# Patient Record
Sex: FEMALE | Race: WHITE | URBAN - METROPOLITAN AREA
[De-identification: names, ages, dates, MRNs, and addresses within clinical notes are randomized per-mention and may not be internally consistent; named-entity substitution may affect disease eponyms.]

---

## 2017-10-05 PROBLEM — G47.30 SLEEP APNEA IN ADULT: Status: ACTIVE | Noted: 2017-10-05

## 2017-11-16 ENCOUNTER — APPOINTMENT (RX ONLY)
Dept: URBAN - METROPOLITAN AREA CLINIC 31 | Facility: CLINIC | Age: 62
Setting detail: DERMATOLOGY
End: 2017-11-16

## 2017-11-16 DIAGNOSIS — L57.0 ACTINIC KERATOSIS: ICD-10-CM

## 2017-11-16 DIAGNOSIS — D18.0 HEMANGIOMA: ICD-10-CM

## 2017-11-16 DIAGNOSIS — L57.8 OTHER SKIN CHANGES DUE TO CHRONIC EXPOSURE TO NONIONIZING RADIATION: ICD-10-CM

## 2017-11-16 DIAGNOSIS — D22 MELANOCYTIC NEVI: ICD-10-CM

## 2017-11-16 DIAGNOSIS — L82.1 OTHER SEBORRHEIC KERATOSIS: ICD-10-CM

## 2017-11-16 PROBLEM — L40.0 PSORIASIS VULGARIS: Status: ACTIVE | Noted: 2017-11-16

## 2017-11-16 PROBLEM — D22.5 MELANOCYTIC NEVI OF TRUNK: Status: ACTIVE | Noted: 2017-11-16

## 2017-11-16 PROBLEM — D18.01 HEMANGIOMA OF SKIN AND SUBCUTANEOUS TISSUE: Status: ACTIVE | Noted: 2017-11-16

## 2017-11-16 PROCEDURE — ? COUNSELING

## 2017-11-16 PROCEDURE — 17000 DESTRUCT PREMALG LESION: CPT

## 2017-11-16 PROCEDURE — 17003 DESTRUCT PREMALG LES 2-14: CPT

## 2017-11-16 PROCEDURE — ? LIQUID NITROGEN

## 2017-11-16 PROCEDURE — 99214 OFFICE O/P EST MOD 30 MIN: CPT | Mod: 25

## 2017-11-16 ASSESSMENT — LOCATION SIMPLE DESCRIPTION DERM
LOCATION SIMPLE: RIGHT FOREARM
LOCATION SIMPLE: LEFT SHOULDER
LOCATION SIMPLE: RIGHT TEMPLE
LOCATION SIMPLE: LEFT ANTERIOR NECK
LOCATION SIMPLE: RIGHT NOSE
LOCATION SIMPLE: LEFT UPPER BACK
LOCATION SIMPLE: CHEST
LOCATION SIMPLE: LEFT FOREARM
LOCATION SIMPLE: LEFT HAND
LOCATION SIMPLE: RIGHT CHEEK
LOCATION SIMPLE: RIGHT HAND
LOCATION SIMPLE: RIGHT UPPER BACK

## 2017-11-16 ASSESSMENT — LOCATION DETAILED DESCRIPTION DERM
LOCATION DETAILED: LEFT MEDIAL INFERIOR CHEST
LOCATION DETAILED: LEFT MEDIAL UPPER BACK
LOCATION DETAILED: RIGHT MEDIAL TEMPLE
LOCATION DETAILED: RIGHT RADIAL DORSAL HAND
LOCATION DETAILED: LEFT POSTERIOR SHOULDER
LOCATION DETAILED: LEFT ULNAR DORSAL HAND
LOCATION DETAILED: RIGHT INFERIOR CENTRAL MALAR CHEEK
LOCATION DETAILED: RIGHT PROXIMAL DORSAL FOREARM
LOCATION DETAILED: LEFT SUPERIOR ANTERIOR NECK
LOCATION DETAILED: LEFT PROXIMAL DORSAL FOREARM
LOCATION DETAILED: RIGHT MID-UPPER BACK
LOCATION DETAILED: RIGHT NASAL SIDEWALL

## 2017-11-16 ASSESSMENT — LOCATION ZONE DERM
LOCATION ZONE: ARM
LOCATION ZONE: HAND
LOCATION ZONE: NOSE
LOCATION ZONE: FACE
LOCATION ZONE: NECK
LOCATION ZONE: TRUNK

## 2017-11-16 NOTE — PROCEDURE: REASSURANCE
Detail Level: Zone
Include Location In Plan?: No
Additional Note: Patient is reassured regarding the benign nature of this/these lesion(s).  Patient is encouraged to return for evaluation if lesion(s) grow, change, or becomes symptomatic.\\n

## 2017-11-16 NOTE — PROCEDURE: LIQUID NITROGEN
Consent: The patient's consent was obtained including but not limited to risks of crusting, scabbing, blistering, scarring, darker or lighter pigmentary change, recurrence, incomplete removal and infection.
Render Post-Care Instructions In Note?: no
Detail Level: Detailed
Duration Of Freeze Thaw-Cycle (Seconds): 15
Number Of Freeze-Thaw Cycles: 1 freeze-thaw cycle
Post-Care Instructions: I reviewed with the patient in detail post-care instructions. Patient is to wear sunprotection, and avoid picking at any of the treated lesions. Pt may apply Vaseline to crusted or scabbing areas.

## 2017-12-26 PROBLEM — E78.5 DYSLIPIDEMIA: Status: ACTIVE | Noted: 2017-12-26

## 2017-12-26 PROBLEM — E03.8 SUBCLINICAL HYPOTHYROIDISM: Status: ACTIVE | Noted: 2017-12-26

## 2018-04-12 ENCOUNTER — OFFICE VISIT (OUTPATIENT)
Dept: RHEUMATOLOGY | Facility: PHYSICIAN GROUP | Age: 63
End: 2018-04-12
Payer: MEDICAID

## 2018-04-12 VITALS
TEMPERATURE: 98.5 F | OXYGEN SATURATION: 96 % | SYSTOLIC BLOOD PRESSURE: 122 MMHG | BODY MASS INDEX: 28.02 KG/M2 | HEART RATE: 90 BPM | DIASTOLIC BLOOD PRESSURE: 92 MMHG | RESPIRATION RATE: 12 BRPM | HEIGHT: 65 IN | WEIGHT: 168.2 LBS

## 2018-04-12 DIAGNOSIS — M79.643 PAIN OF HAND, UNSPECIFIED LATERALITY: ICD-10-CM

## 2018-04-12 PROCEDURE — 99204 OFFICE O/P NEW MOD 45 MIN: CPT | Performed by: INTERNAL MEDICINE

## 2018-04-12 ASSESSMENT — PAIN SCALES - GENERAL: PAINLEVEL: 1=MINIMAL PAIN

## 2018-04-12 NOTE — PROGRESS NOTES
Chief Complaint- hand pain    Chief Complaint   Patient presents with   • New Patient     Arthritis     Malu Reyes is a 62 y.o. female here as a new Rheumatology Consult referred by Margoth Elliott D.O. for consultation regarding hand pain and erosive CMC arthropahty    HPI:     Ms. Malu Reyes present with a history of psoriasis and history of osteoarthritis and left CMC joint.    SHe has had pain in the hands bilateral  Ongoing for 20 years.  She notes pain in the thenar eminence.  She has had injections with some success.  The pain in the hands is intermittent and often dependent on the work she does.  She also notes the 2nd right hand is turning.    In terms of her joint pain she feels it hurts at different times, however she also notes that merary hands are stiff in the mornings.  She also has pain in the shoulders.  She also has pain in the elbows that is caused by use.    She does have a history of psoriasis that is relatively controlled with placing ointment on it.      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      Past Medical History: psoriasis since age 14, hypothyroidism    Family History: thyroid cancer, goiter    Social History: former smoker    Current medicines (including changes today)  Current Outpatient Prescriptions   Medication Sig Dispense Refill   • Diclofenac Sodium 1 % Gel Apply 1 Application to skin as directed 2 times a day as needed. 30 g 0   • levothyroxine (SYNTHROID) 50 MCG Tab Take 1 Tab by mouth Every morning on an empty stomach. 90 Tab 1   • Probiotic Product (PRO-BIOTIC BLEND) Cap Take  by mouth.     • Amino Acids (AMINO ACID PO) Take  by mouth.     • TURMERIC PO Take  by mouth.     • magnesium oxide (MAG-OX) 400 MG Tab Take 400 mg by mouth every day.     • Non Formulary Request every day.     • ASHWAGANDHA PO Take  by mouth.     • Boswellia Paige (BOSWELLIA PO) Take  by mouth.     • vitamin D (CHOLECALCIFEROL) 1000 UNIT Tab  "Take 1,000 Units by mouth every day.       No current facility-administered medications for this visit.      She  has a past medical history of Arthritis; Baker's cyst of knee; Dyslipidemia, LDL goal < 160 (2017); Osteoarthritis of left thumb (2016); Sleep apnea in adult (10/5/2017); and Subclinical hypothyroidism (2017).  She  has a past surgical history that includes other orthopedic surgery (Left, 2016).  Family History   Problem Relation Age of Onset   • Arthritis Mother    • Heart Disease Mother    • Hypertension Mother    • Cancer Father    • Arthritis Sister    • Diabetes Sister    • Hypertension Sister    • Arthritis Sister    • Hypertension Sister      Family Status   Relation Status   • Mother Alive   • Father    • Sister    • Sister      Social History   Substance Use Topics   • Smoking status: Former Smoker     Packs/day: 0.50     Years: 6.00     Types: Cigarettes     Quit date: 4/10/1980   • Smokeless tobacco: Never Used   • Alcohol use 0.6 - 1.2 oz/week     1 - 2 Glasses of wine per week      Comment: Rare     Social History     Social History Narrative   • No narrative on file         ROS  Constitutional ROS: No unexpected change in weight  Eye ROS: No eye pain, redness, discharge  Ear ROS: No ear pain  Mouth/Throat ROS: No bleeding gums  Neck ROS: No lumps or masses, No swollen glands  Pulmonary ROS: No wheezing, No shortness of breath  Cardiovascular ROS: No chest pain, No shortness of breath  Gastrointestinal ROS: No change in bowel habits but has chronic constipation  Musculoskeletal/Extremities ROS: No clubbing, Positive for arthritis   Hematologic/Lymphatic ROS: No coagulation disorder  Skin/Integumentary ROS: psoriasis  Neurologic ROS: Normal development       Objective:     Blood pressure 122/92, pulse 90, temperature 36.9 °C (98.5 °F), resp. rate 12, height 1.651 m (5' 5\"), weight 76.3 kg (168 lb 3.2 oz), SpO2 96 %. Body mass index is 27.99 kg/m².  Physical " "Exam:    Vitals:    04/12/18 1053   BP: 122/92   Pulse: 90   Resp: 12   Temp: 36.9 °C (98.5 °F)   SpO2: 96%   Weight: 76.3 kg (168 lb 3.2 oz)   Height: 1.651 m (5' 5\")    Body mass index is 27.99 kg/m².    General/Constitutional: NAD not diaphoretic, comfortable  Eyes: clear conjunctiva, no scleral icterus, EOMI, PERRL  Ears, Nose, Mouth,Throat: no oral ulcers, good dentition, moist mucous membranes, no discharge from ears bilaterally  Cardiovascular: regular rate and rhythm.  No murmurs, gallops, rubs  Respiratory: normal effort, unlabored respiration.  On auscultation no wheezes, rales, rhonchi.  Clear to auscultation.  GI: soft, NTTP no hepatosplenomegaly, nondistended  Musculoskeletal  Axial:  Thoracic: no kyphosis  Upper Extremities:  No synovitis of the PIP, DIP, MCP but tenderness at the CMC>  There is mild soreness in the PIP of the 2nd and 3rd joint  Heberbon nodes appreciated at the DIP  Wrists and Elbows have good ROM  Muscle Strength: 5/5 in deltoids, biceps, triceps, finger , wrists extension bilateral  Lower Extremities:  No knee effusion bilateral, No crepitus bilateral  No MTP tenderness bilateral; no enthesitis at the Achilles  Muscle Strength: 5/5 in dorsiflexion, plantarflexion, knee extension, knee flexion, and hip flexion bilateral  Gait is normal  Skin: Limited skin exam.  no rashes, no digital ulcerations, no alopecia, no tophi, no skin thickening, no nodules.  On the forearm and elbow a few patches of psoriasis on the extensor surface of the elbow  Neuro: CN II-XII grossly intact, Alert, Oriented x 3, moves all four extremities  Psych: normal affect, normal mood, judgement appropriate, follows commands, responses are appropritae  Heme/Lymph: no cervical adenopathy      No results found for: QNTTBGOLD  No results found for: HEPBCORTOT, HEPBCORIGM, HEPBSAG  No results found for: HEPCAB  Lab Results   Component Value Date/Time    SODIUM 138 09/13/2017 09:19 AM    POTASSIUM 4.7 09/13/2017 " 09:19 AM    CHLORIDE 101 09/13/2017 09:19 AM    CO2 20 09/13/2017 09:19 AM    GLUCOSE 95 09/13/2017 09:19 AM    BUN 14 09/13/2017 09:19 AM    CREATININE 0.81 09/13/2017 09:19 AM    BUNCREATRAT 17 09/13/2017 09:19 AM      Lab Results   Component Value Date/Time    WBC 7.5 12/18/2017 10:20 AM    RBC 4.95 12/18/2017 10:20 AM    HEMOGLOBIN 15.8 12/18/2017 10:20 AM    HEMATOCRIT 45.2 12/18/2017 10:20 AM    MCV 91.3 12/18/2017 10:20 AM    MCH 31.9 (H) 12/18/2017 10:20 AM    MCHC 35.0 12/18/2017 10:20 AM    MPV 11.6 (H) 12/18/2017 10:20 AM      Lab Results   Component Value Date/Time    CALCIUM 9.6 09/13/2017 09:19 AM    ASTSGOT 17 09/13/2017 09:19 AM    ALTSGPT 19 09/13/2017 09:19 AM    ALKPHOSPHAT 116 09/13/2017 09:19 AM    TBILIRUBIN 0.5 09/13/2017 09:19 AM    ALBUMIN 4.5 09/13/2017 09:19 AM    TOTPROTEIN 7.0 09/13/2017 09:19 AM     Lab Results   Component Value Date/Time    RHEUMFACTN <10.0 10/13/2017 09:34 AM     No results found for: SEDRATEWES, CREACTPROT  No results found for: RUSSELVIPER, DRVVTINTERP  No results found for: P5LCVEBPTSI, N7QWPGMGKCC, IGGCARDIOLI, IGMCARDIOLI, IGACARDIOLI, CRYOGLOBULIN  Lab Results   Component Value Date/Time    ANADIRECT Negative 10/13/2017 09:34 AM     No results found for: ANTIDNADS, DSDNA, AGBMAB, GBMABA, ANCAIGG, [id number], JO1AB, RNPAB, ANTISSASJ, ANTISSBSJ  No results found for: COLORURINE, SPECGRAVITY, PHURINE, GLUCOSEUR, KETONES, PROTEINURIN  No results found for: TOTPROTUR, TOTALVOLUME, [id number]  No results found for: SSA60, SSA52  No results found for: HBA1C  No results found for: CPKTOTAL  No results found for: G6PD  No results found for: [id number]  No results found for: ACESERUM  Lab Results   Component Value Date/Time    25HYDROXY 34.4 10/13/2017 09:34 AM     Lab Results   Component Value Date/Time    TSH 3.990 10/13/2017 09:34 AM    FREEDIR 1.19 04/29/2015 08:57 AM     Lab Results   Component Value Date/Time    TSHULTRASEN 1.65 12/18/2017 10:20 AM    FREET4 1.17  12/18/2017 10:20 AM     No results found for: MICROSOMALA, ANTITHYROGL  No results found for: IGGLYMEABS  No results found for: ANTIMITOCHO, FACTIN  No results found for: IGA, TTRANSIGA, ENDOIGA  No results found for: FLTYPE, CRYSTALSBDF  No results found for: ISTATICAL  No results found for: ISTATCREAT  No results found for: CTELOPEP  No results found for: GBMABG  No results found for: PTHINTACT          Results for orders placed in visit on 02/12/16   DX-KNEE COMPLETE 4+ LEFT    Impression Plain film radiographs of the knees obtained today reveal mild  degenerative changes mainly in the medial compartment.  There is  some loss of joint space but no significant osteophyte formation  appreciated.             Results for orders placed in visit on 05/26/15   HAND 3+    Impression Three views of both the left and the right hand show that she has  severe arthritic changes of the CMC joint of both thumbs.           Results for orders placed during the hospital encounter of 10/24/17   DX-FINGER(S) 2+ RIGHT    Impression Degenerative changes as above.    Angel Dunn MD  10/24/2017 11:54 AM          Results for orders placed in visit on 06/24/15   CERVICAL SPINE-2 OR 3 VIEWS    Impression Radiographic evaluation in the clinic today, 3 views of the  cervical spine, demonstrate C5-6 disc space narrowing and  arthritic changes.            Assessment and Plan:  Ms. Malu Reyes presents with a history of arthralgias and psoriasis.  She does note inflammatory features however they are somewhat vague.  Will consider that there are both mechanical and an inflammatory component in her symptoms.  Will consider plaquenil.  We did discuss this with the patient today.  Will work-up for spondyloarthropathy. She does not have features of inflammatory back pain so will not pursue sacroiliac films.    I reviewed the side effects of hydroxychloroquine including but not limited to headache, retinal toxicity, skin rash.  I emphasized  the importance of baseline and annual follow-up with evaluation with ophthalmology.    1. Pain of hand, unspecified laterality  [id number]    CCP    G6PD QUANT + RBC    CBC WITH DIFFERENTIAL    COMP METABOLIC PANEL    CRP QUANTITIVE (NON-CARDIAC)    WESTERGREN SED RATE           Followup: Return in about 4 weeks (around 5/10/2018). or sooner prn  Patient was seen 60 minutes face-to-face (excluding time for procedures)  of which more than 50% the time was spent counseling the patient regarding  rheumatological conditions and care. Therapy was discussed in detail.  Thank you for this referral.

## 2018-04-12 NOTE — LETTER
[facility]   [address], [address]  [place] [address]  Phone: [phone]  Fax: [phone]              Encounter Date: 4/12/2018    Dear Dr. Elliott,    It was a pleasure seeing your patient, Malu Reyes, on 4/12/2018. The encounter diagnosis was Pain of hand, unspecified laterality.     Please find attached progress note which includes the history I obtained from Ms. Reyes, my physical examination findings, my impression and recommendations.      Once again, it was a pleasure participating in your patient's care.  Please feel free to contact me if you have any questions or if I can be of any further assistance to your patients.      Sincerely,    Ewa Shelton M.D.  Electronically Signed          PROGRESS NOTE:  Chief Complaint- hand pain    Chief Complaint   Patient presents with   • New Patient     Arthritis     Malu Reyes is a 62 y.o. female here as a new Rheumatology Consult referred by Margoth Elliott D.O. for consultation regarding hand pain and erosive CMC arthropahty    HPI:     Ms. Malu Reyes present with a history of psoriasis and history of osteoarthritis and left CMC joint.    SHe has had pain in the hands bilateral  Ongoing for 20 years.  She notes pain in the thenar eminence.  She has had injections.  The pain in the hands is intermittent and often dependent on the work she does.  She also notes the 2nd right hand is turning.    In terms of her joint pain she feels it hurts at different times.  Her hands are stiff in the mornings.    She also has pain n the shoulders.  She also has pain in the elbows that is caused by use.    She does have a history of psoriasis that is relatively controlled with placing ointment on it.      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      Past Medical History: psoriasis since age 14, hypothyroidism    Family History: thyroid cancer, goiter      Current medicines (including  changes today)  Current Outpatient Prescriptions   Medication Sig Dispense Refill   • levothyroxine (SYNTHROID) 50 MCG Tab Take 1 Tab by mouth Every morning on an empty stomach. 90 Tab 1   • Probiotic Product (PRO-BIOTIC BLEND) Cap Take  by mouth.     • Amino Acids (AMINO ACID PO) Take  by mouth.     • TURMERIC PO Take  by mouth.     • magnesium oxide (MAG-OX) 400 MG Tab Take 400 mg by mouth every day.     • Non Formulary Request every day.     • ASHWAGANDHA PO Take  by mouth.     • Boswellia Paige (BOSWELLIA PO) Take  by mouth.     • vitamin D (CHOLECALCIFEROL) 1000 UNIT Tab Take 1,000 Units by mouth every day.       No current facility-administered medications for this visit.      She  has a past medical history of Arthritis; Baker's cyst of knee; Dyslipidemia, LDL goal < 160 (2017); Osteoarthritis of left thumb (2016); Sleep apnea in adult (10/5/2017); and Subclinical hypothyroidism (2017).  She  has a past surgical history that includes other orthopedic surgery (Left, 2016).  Family History   Problem Relation Age of Onset   • Arthritis Mother    • Heart Disease Mother    • Hypertension Mother    • Cancer Father    • Arthritis Sister    • Diabetes Sister    • Hypertension Sister    • Arthritis Sister    • Hypertension Sister      Family Status   Relation Status   • Mother Alive   • Father    • Sister    • Sister      Social History   Substance Use Topics   • Smoking status: Former Smoker     Packs/day: 0.50     Years: 6.00     Types: Cigarettes     Quit date: 4/10/1980   • Smokeless tobacco: Never Used   • Alcohol use 0.6 - 1.2 oz/week     1 - 2 Glasses of wine per week      Comment: Rare     Social History     Social History Narrative   • No narrative on file         ROS  Constitutional ROS: No unexpected change in weight  Eye ROS: No eye pain, redness, discharge  Ear ROS: No ear pain  Mouth/Throat ROS: No bleeding gums  Neck ROS: No lumps or masses, No swollen glands  Pulmonary  "ROS: No wheezing, No shortness of breath  Cardiovascular ROS: No chest pain, No shortness of breath  Gastrointestinal ROS: No change in bowel habits but has chronic constipation  Musculoskeletal/Extremities ROS: No clubbing, Positive for arthritis   Hematologic/Lymphatic ROS: No coagulation disorder  Skin/Integumentary ROS: psoriasis  Neurologic ROS: Normal development       Objective:     Blood pressure 122/92, pulse 90, temperature 36.9 °C (98.5 °F), resp. rate 12, height 1.651 m (5' 5\"), weight 76.3 kg (168 lb 3.2 oz), SpO2 96 %. Body mass index is 27.99 kg/m².  Physical Exam:    Vitals:    04/12/18 1053   BP: 122/92   Pulse: 90   Resp: 12   Temp: 36.9 °C (98.5 °F)   SpO2: 96%   Weight: 76.3 kg (168 lb 3.2 oz)   Height: 1.651 m (5' 5\")    Body mass index is 27.99 kg/m².    General/Constitutional: NAD not diaphoretic, comfortable  Eyes: clear conjunctiva, no scleral icterus, EOMI, PERRL  Ears, Nose, Mouth,Throat: no oral ulcers, good dentition, moist mucous membranes, no discharge from ears bilaterally  Cardiovascular: regular rate and rhythm.  No murmurs, gallops, rubs  Respiratory: normal effort, unlabored respiration.  On auscultation no wheezes, rales, rhonchi.  Clear to auscultation.  GI: soft, NTTP no hepatosplenomegaly, nondistended  Musculoskeletal  Axial:  Cervical  Thoracic: no kyphosis  Lumbar  Upper Extremities:  No synovitis of the PIP, DIP, MCP but tenderness at the CMC>  There is mild soreness in the PIP of the 2nd and 3rd joint  Heberbon nodes appreciated at the DIP  Wrists and Elbows have good ROM  Muscle Strength: 5/5 in deltoids, biceps, triceps, finger , wrists extension bilateral  Lower Extremities:  No knee effusion bilateral, No crepitus bilateral  No MTP tenderness bilateral; no enthesitis at the Achilles  Muscle Strength: 5/5 in dorsiflexion, plantarflexion, knee extension, knee flexion, and hip flexion bilateral  Gait is normal  Skin: Limited skin exam.  no rashes, no digital " ulcerations, no alopecia, no tophi, no skin thickening, no nodules  Neuro: CN II-XII grossly intact, Alert, Oriented x 3, moves all four extremities  Psych: normal affect, normal mood, judgement appropriate, follows commands, responses are appropritae  Heme/Lymph: no cervical adenopathy      No results found for: QNTTBGOLD  No results found for: HEPBCORTOT, HEPBCORIGM, HEPBSAG  No results found for: HEPCAB  Lab Results   Component Value Date/Time    SODIUM 138 09/13/2017 09:19 AM    POTASSIUM 4.7 09/13/2017 09:19 AM    CHLORIDE 101 09/13/2017 09:19 AM    CO2 20 09/13/2017 09:19 AM    GLUCOSE 95 09/13/2017 09:19 AM    BUN 14 09/13/2017 09:19 AM    CREATININE 0.81 09/13/2017 09:19 AM    BUNCREATRAT 17 09/13/2017 09:19 AM      Lab Results   Component Value Date/Time    WBC 7.5 12/18/2017 10:20 AM    RBC 4.95 12/18/2017 10:20 AM    HEMOGLOBIN 15.8 12/18/2017 10:20 AM    HEMATOCRIT 45.2 12/18/2017 10:20 AM    MCV 91.3 12/18/2017 10:20 AM    MCH 31.9 (H) 12/18/2017 10:20 AM    MCHC 35.0 12/18/2017 10:20 AM    MPV 11.6 (H) 12/18/2017 10:20 AM      Lab Results   Component Value Date/Time    CALCIUM 9.6 09/13/2017 09:19 AM    ASTSGOT 17 09/13/2017 09:19 AM    ALTSGPT 19 09/13/2017 09:19 AM    ALKPHOSPHAT 116 09/13/2017 09:19 AM    TBILIRUBIN 0.5 09/13/2017 09:19 AM    ALBUMIN 4.5 09/13/2017 09:19 AM    TOTPROTEIN 7.0 09/13/2017 09:19 AM     Lab Results   Component Value Date/Time    RHEUMFACTN <10.0 10/13/2017 09:34 AM     No results found for: SEDRATEWES, CREACTPROT  No results found for: RUSSELVIPER, DRVVTINTERP  No results found for: [id number], [id number], IGGCARDIOLI, IGMCARDIOLI, IGACARDIOLI, CRYOGLOBULIN  Lab Results   Component Value Date/Time    ANADIRECT Negative 10/13/2017 09:34 AM     No results found for: ANTIDNADS, DSDNA, AGBMAB, GBMABA, ANCAIGG, [id number], JO1AB, RNPAB, ANTISSASJ, ANTISSBSJ  No results found for: COLORURINE, SPECGRAVITY, PHURINE, GLUCOSEUR, KETONES, PROTEINURIN  No results found for:  TOTPROTUR, TOTALVOLUME, [id number]  No results found for: SSA60, SSA52  No results found for: HBA1C  No results found for: CPKTOTAL  No results found for: G6PD  No results found for: [id number]  No results found for: ACESERUM  Lab Results   Component Value Date/Time    25HYDROXY 34.4 10/13/2017 09:34 AM     Lab Results   Component Value Date/Time    TSH 3.990 10/13/2017 09:34 AM    FREEDIR 1.19 04/29/2015 08:57 AM     Lab Results   Component Value Date/Time    TSHULTRASEN 1.65 12/18/2017 10:20 AM    FREET4 1.17 12/18/2017 10:20 AM     No results found for: MICROSOMALA, ANTITHYROGL  No results found for: IGGLYMEABS  No results found for: ANTIMITOCHO, FACTIN  No results found for: IGA, TTRANSIGA, ENDOIGA  No results found for: FLTYPE, CRYSTALSBDF  No results found for: ISTATICAL  No results found for: ISTATCREAT  No results found for: CTELOPEP  No results found for: GBMABG  No results found for: PTHINTACT                         Results for orders placed in visit on 02/12/16   DX-KNEE COMPLETE 4+ LEFT    Impression Plain film radiographs of the knees obtained today reveal mild  degenerative changes mainly in the medial compartment.  There is  some loss of joint space but no significant osteophyte formation  appreciated.             Results for orders placed in visit on 05/26/15   HAND 3+    Impression Three views of both the left and the right hand show that she has  severe arthritic changes of the CMC joint of both thumbs.           Results for orders placed during the hospital encounter of 10/24/17   DX-FINGER(S) 2+ RIGHT    Impression Degenerative changes as above.    Angel Dunn MD  10/24/2017 11:54 AM                                      Results for orders placed in visit on 06/24/15   CERVICAL SPINE-2 OR 3 VIEWS    Impression Radiographic evaluation in the clinic today, 3 views of the  cervical spine, demonstrate C5-6 disc space narrowing and  arthritic changes.                                                  Assessment and Plan:  Ms. Malu Reyes presents with a history of arthralgias.      Followup: No Follow-up on file. or sooner prn  Patient was seen 60 minutes face-to-face (excluding time for procedures)  of which more than 50% the time was spent counseling the patient regarding  rheumatological conditions and care. Therapy was discussed in detail.  Thank you for this referral.

## 2018-05-17 ENCOUNTER — OFFICE VISIT (OUTPATIENT)
Dept: RHEUMATOLOGY | Facility: PHYSICIAN GROUP | Age: 63
End: 2018-05-17
Payer: MEDICAID

## 2018-05-17 VITALS
SYSTOLIC BLOOD PRESSURE: 120 MMHG | BODY MASS INDEX: 27.92 KG/M2 | OXYGEN SATURATION: 96 % | HEART RATE: 88 BPM | RESPIRATION RATE: 16 BRPM | DIASTOLIC BLOOD PRESSURE: 82 MMHG | TEMPERATURE: 98.6 F | WEIGHT: 167.8 LBS

## 2018-05-17 DIAGNOSIS — L40.9 PSORIASIS: ICD-10-CM

## 2018-05-17 DIAGNOSIS — M18.12 OSTEOARTHRITIS OF LEFT THUMB: ICD-10-CM

## 2018-05-17 PROCEDURE — 99213 OFFICE O/P EST LOW 20 MIN: CPT | Performed by: INTERNAL MEDICINE

## 2018-05-17 RX ORDER — THIAMINE HCL 100 MG
TABLET ORAL
COMMUNITY

## 2018-05-17 ASSESSMENT — PAIN SCALES - GENERAL: PAINLEVEL: 4=SLIGHT-MODERATE PAIN

## 2018-05-17 ASSESSMENT — ENCOUNTER SYMPTOMS
CHILLS: 0
FEVER: 0

## 2018-05-17 NOTE — LETTER
[facility]   [address], [address]  [place] [address]  Phone: [phone]  Fax: [phone]              Encounter Date: 5/17/2018    Dear Dr. Elliott,    It was a pleasure seeing your patient, Malu Reyes, on 5/17/2018. Diagnoses of Osteoarthritis of left thumb and Psoriasis were pertinent to this visit.     Please find attached progress note which includes the history I obtained from Ms. Reyes, my physical examination findings, my impression and recommendations.      Once again, it was a pleasure participating in your patient's care.  Please feel free to contact me if you have any questions or if I can be of any further assistance to your patients.      Sincerely,    Ewa Shelton M.D.  Electronically Signed          PROGRESS NOTE:  ESTABLISHED PATIENT    Ms. Malu Reyes is a 62 y.o. female here for follow-up for psoriasis and osteoarthritis and Left CMC joint pain      Psoriasis  Stable      osteoarthritis and left CMC joint pain  Tolerable  She denies any morning stiffness  Pain is worse with use  She did read information regarding plaquenil and is not interested  She did have injections in the CMC and did not find these helpful  She did have surgery in the left CMC and felt it has not improved her joint pain        RHEUM HISTORY  Ms. Malu Reyes present with a history of psoriasis and history of osteoarthritis and left CMC joint.     SHe has had pain in the hands bilateral  Ongoing for 20 years.  She notes pain in the thenar eminence.  She has had injections with some success.  The pain in the hands is intermittent and often dependent on the work she does.  She also notes the 2nd right hand is turning.     In terms of her joint pain she feels it hurts at different times, however she also notes that her hands are stiff in the mornings.  She also has pain in the shoulders.  She also has pain in the elbows that is caused by use.     She does have a history of psoriasis  that is relatively controlled with placing ointment on it.       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       Past Medical History: psoriasis since age 14, hypothyroidism     Family History: thyroid cancer, goiter     Social History: former smoker             Current Outpatient Prescriptions on File Prior to Visit   Medication Sig Dispense Refill   • Diclofenac Sodium 1 % Gel Apply 1 Application to skin as directed 2 times a day as needed. 30 g 0   • levothyroxine (SYNTHROID) 50 MCG Tab Take 1 Tab by mouth Every morning on an empty stomach. 90 Tab 1   • Probiotic Product (PRO-BIOTIC BLEND) Cap Take  by mouth.     • Amino Acids (AMINO ACID PO) Take  by mouth.     • TURMERIC PO Take  by mouth.     • magnesium oxide (MAG-OX) 400 MG Tab Take 400 mg by mouth every day.     • ASHWAGANDHA PO Take  by mouth.     • Boswellia Paige (BOSWELLIA PO) Take  by mouth.     • vitamin D (CHOLECALCIFEROL) 1000 UNIT Tab Take 1,000 Units by mouth every day.     • Non Formulary Request every day.       No current facility-administered medications on file prior to visit.        REVIEW OF SYSTEMS  Review of Systems   Constitutional: Negative for chills and fever.   Cardiovascular: Negative for chest pain.   Musculoskeletal: Positive for joint pain.       PHYSICAL EXAM  Ambulatory Vitals  Encounter Vitals  Temperature: 37 °C (98.6 °F)  Temp Source: Temporal  Blood Pressure: 120/82  BP Location: Left, Upper Arm  Patient BP Position: Sitting  Pulse: 88  Respiration: 16  Pulse Oximetry: 96 %  Weight: 76.1 kg (167 lb 12.8 oz)  Weight Source: Stand Up Scale  Pain Score: 4=Slight-Moderate Pain  Location: Hip    Physical Exam   Constitutional: She is oriented to person, place, and time and well-developed, well-nourished, and in no distress. No distress.   HENT:   Head: Normocephalic and atraumatic.   Right Ear: External ear normal.   Left Ear: External ear normal.   Eyes: Conjunctivae are normal.  Right eye exhibits no discharge. Left eye exhibits no discharge. No scleral icterus.   Pulmonary/Chest: No stridor. No respiratory distress.   Musculoskeletal: She exhibits no edema.   No active synovitis of the MCP, PIP   Neurological: She is alert and oriented to person, place, and time. Gait normal.   Skin: Skin is warm and dry. She is not diaphoretic.   Limited exam   Psychiatric: Mood, memory, affect and judgment normal.           DIAGNOSTICS:    Labs    [unfilled]    No results found for: QNTTBGOLD  No results found for: HEPBCORTOT, HEPBCORIGM, HEPBSAG  No results found for: HEPCAB  Lab Results   Component Value Date/Time    SODIUM 138 05/07/2018 09:25 AM    POTASSIUM 4.2 05/07/2018 09:25 AM    CHLORIDE 103 05/07/2018 09:25 AM    CO2 27 05/07/2018 09:25 AM    GLUCOSE 93 05/07/2018 09:25 AM    BUN 20 (H) 05/07/2018 09:25 AM    CREATININE 0.9 05/07/2018 09:25 AM    BUNCREATRAT 17 09/13/2017 09:19 AM      Lab Results   Component Value Date/Time    WBC 7.3 05/07/2018 09:25 AM    RBC 5.13 05/07/2018 09:25 AM    HEMOGLOBIN 16.4 05/07/2018 09:25 AM    HEMATOCRIT 47.1 05/07/2018 09:25 AM    MCV 91.8 05/07/2018 09:25 AM    MCH 32.0 (H) 05/07/2018 09:25 AM    MCHC 34.8 05/07/2018 09:25 AM    MPV 11.1 (H) 05/07/2018 09:25 AM      Lab Results   Component Value Date/Time    CALCIUM 9.2 05/07/2018 09:25 AM    ASTSGOT 21 05/07/2018 09:25 AM    ALTSGPT 25 05/07/2018 09:25 AM    ALKPHOSPHAT 102 05/07/2018 09:25 AM    TBILIRUBIN 0.6 05/07/2018 09:25 AM    ALBUMIN 3.6 05/07/2018 09:25 AM    TOTPROTEIN 7.5 05/07/2018 09:25 AM     Lab Results   Component Value Date/Time    RHEUMFACTN <10.0 10/13/2017 09:34 AM     Lab Results   Component Value Date/Time    SEDRATEWES 8 05/07/2018 09:25 AM    CREACTPROT 0.7 05/07/2018 09:25 AM     No results found for: RUSSELVIPER, DRVVTINTERP  No results found for: [id number], H0RVGBSEAFF, IGGCARDIOLI, IGMCARDIOLI, IGACARDIOLI, CRYOGLOBULIN  Lab Results   Component Value Date/Time    ANADIRECT  Negative 10/13/2017 09:34 AM     No results found for: ANTIDNADS, DSDNA, AGBMAB, GBMABA, ANCAIGG, [id number], JO1AB, RNPAB, ANTISSASJ, ANTISSBSJ  No results found for: COLORURINE, SPECGRAVITY, PHURINE, GLUCOSEUR, KETONES, PROTEINURIN  No results found for: TOTPROTUR, TOTALVOLUME, [id number]  No results found for: SSA60, SSA52  No results found for: HBA1C  No results found for: CPKTOTAL  No results found for: G6PD  No results found for: [id number]  No results found for: ACESERUM  Lab Results   Component Value Date/Time    25HYDROXY 34.4 10/13/2017 09:34 AM     Lab Results   Component Value Date/Time    TSH 3.990 10/13/2017 09:34 AM    FREEDIR 1.19 04/29/2015 08:57 AM     Lab Results   Component Value Date/Time    TSHULTRASEN 1.92 05/14/2018 10:35 AM    FREET4 1.24 05/14/2018 10:35 AM     No results found for: MICROSOMALA, ANTITHYROGL  No results found for: IGGLYMEABS  No results found for: ANTIMITOCHO, FACTIN  No results found for: IGA, TTRANSIGA, ENDOIGA  No results found for: FLTYPE, CRYSTALSBDF  No results found for: ISTATICAL  No results found for: ISTATCREAT  No results found for: CTELOPEP  No results found for: GBMABG  No results found for: PTHINTACT        Imaging          ASSESSMENT AND PLAN:  Ms. Malu Reyes presents for follow-up of psoriasis and arthralgias      Psoriasis  Stable      Osteoarthritis and left CMC joint pain  We discussed conservative measures  We will try voltaren gel  At this point her pain is primarily mechanical  We considered hand therapy - patient currently defers          1. Osteoarthritis of left thumb     2. Psoriasis       Return in about 1 year (around 5/17/2019).        she agreed and verbalized she agreement and understanding with the current plan. I answered all questions and concerns she has at this time and advised our patient to call at any time in there interim with questions or concerns in regards she care.     Thank you for allowing me to participate in the care of  this patient, I will continue to follow closely.      Please note that this dictation was created using voice recognition software. I have made every reasonable attempt to correct obvious errors, but I expect that there are errors of grammar and possibly content that I did not discover before finalizing the note.

## 2018-05-17 NOTE — PROGRESS NOTES
ESTABLISHED PATIENT    Ms. Malu Reyes is a 62 y.o. female here for follow-up for psoriasis and osteoarthritis and Left CMC joint pain      Psoriasis  Stable      osteoarthritis and left CMC joint pain  Tolerable  She denies any morning stiffness  Pain is worse with use  She did read information regarding plaquenil and is not interested  She did have injections in the CMC and did not find these helpful  She did have surgery in the left CMC and felt it has not improved her joint pain        RHEUM HISTORY  Ms. Malu Reyes present with a history of psoriasis and history of osteoarthritis and left CMC joint.     SHe has had pain in the hands bilateral  Ongoing for 20 years.  She notes pain in the thenar eminence.  She has had injections with some success.  The pain in the hands is intermittent and often dependent on the work she does.  She also notes the 2nd right hand is turning.     In terms of her joint pain she feels it hurts at different times, however she also notes that her hands are stiff in the mornings.  She also has pain in the shoulders.  She also has pain in the elbows that is caused by use.     She does have a history of psoriasis that is relatively controlled with placing ointment on it.       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       Past Medical History: psoriasis since age 14, hypothyroidism     Family History: thyroid cancer, goiter     Social History: former smoker             Current Outpatient Prescriptions on File Prior to Visit   Medication Sig Dispense Refill   • Diclofenac Sodium 1 % Gel Apply 1 Application to skin as directed 2 times a day as needed. 30 g 0   • levothyroxine (SYNTHROID) 50 MCG Tab Take 1 Tab by mouth Every morning on an empty stomach. 90 Tab 1   • Probiotic Product (PRO-BIOTIC BLEND) Cap Take  by mouth.     • Amino Acids (AMINO ACID PO) Take  by mouth.     • TURMERIC PO Take  by mouth.     • magnesium oxide (MAG-OX)  400 MG Tab Take 400 mg by mouth every day.     • ASHWAGANDHA PO Take  by mouth.     • Boswellia Paige (BOSWELLIA PO) Take  by mouth.     • vitamin D (CHOLECALCIFEROL) 1000 UNIT Tab Take 1,000 Units by mouth every day.     • Non Formulary Request every day.       No current facility-administered medications on file prior to visit.        REVIEW OF SYSTEMS  Review of Systems   Constitutional: Negative for chills and fever.   Cardiovascular: Negative for chest pain.   Musculoskeletal: Positive for joint pain.       PHYSICAL EXAM  Ambulatory Vitals  Encounter Vitals  Temperature: 37 °C (98.6 °F)  Temp Source: Temporal  Blood Pressure: 120/82  BP Location: Left, Upper Arm  Patient BP Position: Sitting  Pulse: 88  Respiration: 16  Pulse Oximetry: 96 %  Weight: 76.1 kg (167 lb 12.8 oz)  Weight Source: Stand Up Scale  Pain Score: 4=Slight-Moderate Pain  Location: Hip    Physical Exam   Constitutional: She is oriented to person, place, and time and well-developed, well-nourished, and in no distress. No distress.   HENT:   Head: Normocephalic and atraumatic.   Right Ear: External ear normal.   Left Ear: External ear normal.   Eyes: Conjunctivae are normal. Right eye exhibits no discharge. Left eye exhibits no discharge. No scleral icterus.   Pulmonary/Chest: No stridor. No respiratory distress.   Musculoskeletal: She exhibits no edema.   No active synovitis of the MCP, PIP   Neurological: She is alert and oriented to person, place, and time. Gait normal.   Skin: Skin is warm and dry. She is not diaphoretic.   Limited exam   Psychiatric: Mood, memory, affect and judgment normal.           DIAGNOSTICS:    Labs    [unfilled]    No results found for: QNTTBGOLD  No results found for: HEPBCORTOT, HEPBCORIGM, HEPBSAG  No results found for: HEPCAB  Lab Results   Component Value Date/Time    SODIUM 138 05/07/2018 09:25 AM    POTASSIUM 4.2 05/07/2018 09:25 AM    CHLORIDE 103 05/07/2018 09:25 AM    CO2 27 05/07/2018 09:25 AM    GLUCOSE  93 05/07/2018 09:25 AM    BUN 20 (H) 05/07/2018 09:25 AM    CREATININE 0.9 05/07/2018 09:25 AM    BUNCREATRAT 17 09/13/2017 09:19 AM      Lab Results   Component Value Date/Time    WBC 7.3 05/07/2018 09:25 AM    RBC 5.13 05/07/2018 09:25 AM    HEMOGLOBIN 16.4 05/07/2018 09:25 AM    HEMATOCRIT 47.1 05/07/2018 09:25 AM    MCV 91.8 05/07/2018 09:25 AM    MCH 32.0 (H) 05/07/2018 09:25 AM    MCHC 34.8 05/07/2018 09:25 AM    MPV 11.1 (H) 05/07/2018 09:25 AM      Lab Results   Component Value Date/Time    CALCIUM 9.2 05/07/2018 09:25 AM    ASTSGOT 21 05/07/2018 09:25 AM    ALTSGPT 25 05/07/2018 09:25 AM    ALKPHOSPHAT 102 05/07/2018 09:25 AM    TBILIRUBIN 0.6 05/07/2018 09:25 AM    ALBUMIN 3.6 05/07/2018 09:25 AM    TOTPROTEIN 7.5 05/07/2018 09:25 AM     Lab Results   Component Value Date/Time    RHEUMFACTN <10.0 10/13/2017 09:34 AM     Lab Results   Component Value Date/Time    SEDRATEWES 8 05/07/2018 09:25 AM    CREACTPROT 0.7 05/07/2018 09:25 AM     No results found for: RUSSELVIPER, DRVVTINTERP  No results found for: I4CQIDODDFT, G2UMOICROQA, IGGCARDIOLI, IGMCARDIOLI, IGACARDIOLI, CRYOGLOBULIN  Lab Results   Component Value Date/Time    ANADIRECT Negative 10/13/2017 09:34 AM     No results found for: ANTIDNADS, DSDNA, AGBMAB, GBMABA, ANCAIGG, [id number], JO1AB, RNPAB, ANTISSASJ, ANTISSBSJ  No results found for: COLORURINE, SPECGRAVITY, PHURINE, GLUCOSEUR, KETONES, PROTEINURIN  No results found for: TOTPROTUR, TOTALVOLUME, [id number]  No results found for: SSA60, SSA52  No results found for: HBA1C  No results found for: CPKTOTAL  No results found for: G6PD  No results found for: [id number]  No results found for: ACESERUM  Lab Results   Component Value Date/Time    25HYDROXY 34.4 10/13/2017 09:34 AM     Lab Results   Component Value Date/Time    TSH 3.990 10/13/2017 09:34 AM    FREEDIR 1.19 04/29/2015 08:57 AM     Lab Results   Component Value Date/Time    TSHULTRASEN 1.92 05/14/2018 10:35 AM    FREET4 1.24 05/14/2018  10:35 AM     No results found for: MICROSOMALA, ANTITHYROGL  No results found for: IGGLYMEABS  No results found for: ANTIMITOCHO, FACTIN  No results found for: IGA, TTRANSIGA, ENDOIGA  No results found for: FLTYPE, CRYSTALSBDF  No results found for: ISTATICAL  No results found for: ISTATCREAT  No results found for: CTELOPEP  No results found for: GBMABG  No results found for: PTHINTACT        Imaging          ASSESSMENT AND PLAN:  Ms. Malu Reyes presents for follow-up of psoriasis and arthralgias      Psoriasis  Stable      Osteoarthritis and left CMC joint pain  We discussed conservative measures  We will try voltaren gel  At this point her pain is primarily mechanical  We considered hand therapy - patient currently defers          1. Osteoarthritis of left thumb     2. Psoriasis       Return in about 1 year (around 5/17/2019).        she agreed and verbalized she agreement and understanding with the current plan. I answered all questions and concerns she has at this time and advised our patient to call at any time in there interim with questions or concerns in regards she care.     Thank you for allowing me to participate in the care of this patient, I will continue to follow closely.      Please note that this dictation was created using voice recognition software. I have made every reasonable attempt to correct obvious errors, but I expect that there are errors of grammar and possibly content that I did not discover before finalizing the note.

## 2023-04-13 ENCOUNTER — OFFICE VISIT (OUTPATIENT)
Dept: URBAN - METROPOLITAN AREA CLINIC 45 | Facility: CLINIC | Age: 68
End: 2023-04-13
Payer: MEDICARE

## 2023-04-13 DIAGNOSIS — H02.831 DERMATOCHALASIS OF RIGHT UPPER EYELID: Primary | ICD-10-CM

## 2023-04-13 DIAGNOSIS — H02.834 DERMATOCHALASIS OF LEFT UPPER EYELID: ICD-10-CM

## 2023-04-13 PROCEDURE — 92014 COMPRE OPH EXAM EST PT 1/>: CPT | Performed by: OPHTHALMOLOGY

## 2023-04-13 PROCEDURE — 92081 LIMITED VISUAL FIELD XM: CPT | Performed by: OPHTHALMOLOGY

## 2023-04-13 PROCEDURE — 92285 EXTERNAL OCULAR PHOTOGRAPHY: CPT | Performed by: OPHTHALMOLOGY

## 2023-04-13 ASSESSMENT — INTRAOCULAR PRESSURE
OD: 14
OS: 14

## 2023-04-13 NOTE — IMPRESSION/PLAN
Impression: Dermatochalasis of left upper eyelid: H02.834. Plan: Discussed diagnosis in detail with patient. Discussed treatment options with patient. Discussed risks and benefits and patient understands. Advised patient of condition. Patient elects to have surgery. Described risk of asymmetry following surgical treatment, sometimes requiring secondary surgical intervention. Bleeding and infection, though rare complications, were mentioned to patient. All patient questions addressed and answered. Schedule surgery in the OR. Patient will return postoperatively for suture removal and postoperative care.

## 2023-04-13 NOTE — IMPRESSION/PLAN
Impression: Dermatochalasis of right upper eyelid: H02.831. Plan: Discussed diagnosis in detail with patient. Discussed treatment options with patient. Discussed risks and benefits and patient understands. Advised patient of condition. Patient elects to have surgery. Described risk of asymmetry following surgical treatment, sometimes requiring secondary surgical intervention. Bleeding and infection, though rare complications, were mentioned to patient. All patient questions addressed and answered. Schedule surgery in the OR. Patient will return postoperatively for suture removal and postoperative care. -Advise patient to STOP all OTC blood thinners 10-14 days prior to Surgery

## 2023-06-13 ENCOUNTER — SURGERY (OUTPATIENT)
Dept: URBAN - METROPOLITAN AREA SURGERY 28 | Facility: LOCATION | Age: 68
End: 2023-06-13
Payer: MEDICARE

## 2023-06-13 ENCOUNTER — SURGERY (OUTPATIENT)
Dept: URBAN - METROPOLITAN AREA SURGERY 29 | Facility: LOCATION | Age: 68
End: 2023-06-13
Payer: MEDICARE

## 2023-06-19 ENCOUNTER — POST-OPERATIVE VISIT (OUTPATIENT)
Dept: URBAN - METROPOLITAN AREA CLINIC 45 | Facility: CLINIC | Age: 68
End: 2023-06-19
Payer: MEDICARE

## 2023-06-19 DIAGNOSIS — Z48.89 ENCOUNTER FOR OTHER SPECIFIED SURGICAL AFTERCARE: Primary | ICD-10-CM

## 2023-06-19 PROCEDURE — 99024 POSTOP FOLLOW-UP VISIT: CPT | Performed by: OPHTHALMOLOGY

## 2023-06-19 NOTE — IMPRESSION/PLAN
Impression: S/P Both Upper Eyelid Blepharoplasty OU - 6 Days. Encounter for other specified surgical aftercare  Z48.89. Plan: Patient doing well. Sutures intact and in place. Sutures removed in office today. Advise patient to continue using shield at bed time for 3 more days. Advised patient to discontinue Erythromycim ointment. RTC 9 months for follow up.